# Patient Record
Sex: FEMALE | Race: OTHER | Employment: FULL TIME | URBAN - METROPOLITAN AREA
[De-identification: names, ages, dates, MRNs, and addresses within clinical notes are randomized per-mention and may not be internally consistent; named-entity substitution may affect disease eponyms.]

---
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
## 2020-05-26 ENCOUNTER — OFFICE VISIT (OUTPATIENT)
Dept: FAMILY MEDICINE CLINIC | Age: 20
End: 2020-05-26

## 2020-05-26 DIAGNOSIS — J02.9 SORE THROAT: Primary | ICD-10-CM

## 2020-05-26 DIAGNOSIS — J35.8 TONSILLOLITH: ICD-10-CM

## 2020-05-26 NOTE — PROGRESS NOTES
HISTORY OF PRESENT ILLNESS  Lizy Lowery is a 21 y.o. female. HPI  Patient agrees to telemedicine. Communication done via phone. Patient states she was having a lot of sore throat and couldn't really talk last week because her throat hurt. She denied fever, coughing or bodyaches. Then over the course of the weekend she coughed up what felt like a stone, and her symptoms improved immediately. She is asymptomatic today. And has not had to take any medications. ROS    Physical Exam    ASSESSMENT and PLAN  Diagnoses and all orders for this visit:    1. Sore throat    2. Tonsillolith      It is possible, patient had a tonsillolith,  She is advise to drink plenty of fluids (she does mention she does not drink too much fluids daily). Follow up as needed. COVID-19 hotline contact given to the patient as well if there is any concerns from the patient.   Follow up as needed

## 2021-07-24 ENCOUNTER — HOSPITAL ENCOUNTER (EMERGENCY)
Age: 21
Discharge: HOME | End: 2021-07-25
Attending: EMERGENCY MEDICINE

## 2021-07-24 VITALS
TEMPERATURE: 98.2 F | DIASTOLIC BLOOD PRESSURE: 81 MMHG | RESPIRATION RATE: 16 BRPM | OXYGEN SATURATION: 98 % | HEART RATE: 85 BPM | SYSTOLIC BLOOD PRESSURE: 128 MMHG

## 2021-07-24 DIAGNOSIS — O41.8X10 SUBCHORIONIC HEMORRHAGE OF PLACENTA IN FIRST TRIMESTER, SINGLE OR UNSPECIFIED FETUS: ICD-10-CM

## 2021-07-24 DIAGNOSIS — O46.8X1 SUBCHORIONIC HEMORRHAGE OF PLACENTA IN FIRST TRIMESTER, SINGLE OR UNSPECIFIED FETUS: ICD-10-CM

## 2021-07-24 DIAGNOSIS — M54.50 CHRONIC BILATERAL LOW BACK PAIN WITHOUT SCIATICA: ICD-10-CM

## 2021-07-24 DIAGNOSIS — G89.29 CHRONIC BILATERAL LOW BACK PAIN WITHOUT SCIATICA: ICD-10-CM

## 2021-07-24 DIAGNOSIS — Z3A.10 10 WEEKS GESTATION OF PREGNANCY: ICD-10-CM

## 2021-07-24 DIAGNOSIS — O26.899 PELVIC PAIN DURING PREGNANCY: Primary | ICD-10-CM

## 2021-07-24 DIAGNOSIS — R10.2 PELVIC PAIN DURING PREGNANCY: Primary | ICD-10-CM

## 2021-07-24 DIAGNOSIS — N39.0 URINARY TRACT INFECTION WITHOUT HEMATURIA, SITE UNSPECIFIED: ICD-10-CM

## 2021-07-24 LAB
ALBUMIN SERPL-MCNC: 4 G/DL (ref 3.5–5)
ALBUMIN/GLOB SERPL: 1 {RATIO} (ref 1.1–2.2)
ALP SERPL-CCNC: 68 U/L (ref 45–117)
ALT SERPL-CCNC: 75 U/L (ref 12–78)
ANION GAP SERPL CALC-SCNC: 6 MMOL/L (ref 5–15)
AST SERPL-CCNC: 36 U/L (ref 15–37)
BASOPHILS # BLD: 0.1 K/UL (ref 0–0.1)
BASOPHILS NFR BLD: 0 % (ref 0–1)
BILIRUB SERPL-MCNC: 0.4 MG/DL (ref 0.2–1)
BUN SERPL-MCNC: 7 MG/DL (ref 6–20)
BUN/CREAT SERPL: 12 (ref 12–20)
CALCIUM SERPL-MCNC: 9.3 MG/DL (ref 8.5–10.1)
CHLORIDE SERPL-SCNC: 105 MMOL/L (ref 97–108)
CO2 SERPL-SCNC: 26 MMOL/L (ref 21–32)
CREAT SERPL-MCNC: 0.59 MG/DL (ref 0.55–1.02)
DIFFERENTIAL METHOD BLD: ABNORMAL
EOSINOPHIL # BLD: 0.3 K/UL (ref 0–0.4)
EOSINOPHIL NFR BLD: 3 % (ref 0–7)
ERYTHROCYTE [DISTWIDTH] IN BLOOD BY AUTOMATED COUNT: 11.9 % (ref 11.5–14.5)
GLOBULIN SER CALC-MCNC: 4.2 G/DL (ref 2–4)
GLUCOSE SERPL-MCNC: 115 MG/DL (ref 65–100)
HCG SERPL-ACNC: ABNORMAL MIU/ML (ref 0–6)
HCG UR QL: POSITIVE
HCT VFR BLD AUTO: 37.7 % (ref 35–47)
HGB BLD-MCNC: 12.4 G/DL (ref 11.5–16)
IMM GRANULOCYTES # BLD AUTO: 0.1 K/UL (ref 0–0.04)
IMM GRANULOCYTES NFR BLD AUTO: 1 % (ref 0–0.5)
LYMPHOCYTES # BLD: 3.1 K/UL (ref 0.8–3.5)
LYMPHOCYTES NFR BLD: 28 % (ref 12–49)
MCH RBC QN AUTO: 30.2 PG (ref 26–34)
MCHC RBC AUTO-ENTMCNC: 32.9 G/DL (ref 30–36.5)
MCV RBC AUTO: 92 FL (ref 80–99)
MONOCYTES # BLD: 0.7 K/UL (ref 0–1)
MONOCYTES NFR BLD: 6 % (ref 5–13)
NEUTS SEG # BLD: 7 K/UL (ref 1.8–8)
NEUTS SEG NFR BLD: 62 % (ref 32–75)
NRBC # BLD: 0 K/UL (ref 0–0.01)
NRBC BLD-RTO: 0 PER 100 WBC
PLATELET # BLD AUTO: 336 K/UL (ref 150–400)
PMV BLD AUTO: 10.9 FL (ref 8.9–12.9)
POTASSIUM SERPL-SCNC: 3.5 MMOL/L (ref 3.5–5.1)
PROT SERPL-MCNC: 8.2 G/DL (ref 6.4–8.2)
RBC # BLD AUTO: 4.1 M/UL (ref 3.8–5.2)
SODIUM SERPL-SCNC: 137 MMOL/L (ref 136–145)
UR CULT HOLD, URHOLD: NORMAL
WBC # BLD AUTO: 11.3 K/UL (ref 3.6–11)

## 2021-07-24 PROCEDURE — 87086 URINE CULTURE/COLONY COUNT: CPT

## 2021-07-24 PROCEDURE — 85025 COMPLETE CBC W/AUTO DIFF WBC: CPT

## 2021-07-24 PROCEDURE — 81001 URINALYSIS AUTO W/SCOPE: CPT

## 2021-07-24 PROCEDURE — 36415 COLL VENOUS BLD VENIPUNCTURE: CPT

## 2021-07-24 PROCEDURE — 80053 COMPREHEN METABOLIC PANEL: CPT

## 2021-07-24 PROCEDURE — 99283 EMERGENCY DEPT VISIT LOW MDM: CPT

## 2021-07-24 PROCEDURE — 81025 URINE PREGNANCY TEST: CPT

## 2021-07-24 PROCEDURE — 74011250637 HC RX REV CODE- 250/637: Performed by: NURSE PRACTITIONER

## 2021-07-24 PROCEDURE — 84702 CHORIONIC GONADOTROPIN TEST: CPT

## 2021-07-24 RX ORDER — ACETAMINOPHEN 325 MG/1
650 TABLET ORAL
Status: COMPLETED | OUTPATIENT
Start: 2021-07-24 | End: 2021-07-24

## 2021-07-24 RX ADMIN — ACETAMINOPHEN 650 MG: 325 TABLET ORAL at 22:51

## 2021-07-25 ENCOUNTER — APPOINTMENT (OUTPATIENT)
Dept: ULTRASOUND IMAGING | Age: 21
End: 2021-07-25
Attending: NURSE PRACTITIONER

## 2021-07-25 LAB
APPEARANCE UR: ABNORMAL
BACTERIA URNS QL MICRO: ABNORMAL /HPF
BILIRUB UR QL: NEGATIVE
COLOR UR: ABNORMAL
EPITH CASTS URNS QL MICRO: ABNORMAL /LPF
GLUCOSE UR STRIP.AUTO-MCNC: NEGATIVE MG/DL
HGB UR QL STRIP: NEGATIVE
KETONES UR QL STRIP.AUTO: ABNORMAL MG/DL
LEUKOCYTE ESTERASE UR QL STRIP.AUTO: ABNORMAL
NITRITE UR QL STRIP.AUTO: NEGATIVE
PH UR STRIP: 5 [PH] (ref 5–8)
PROT UR STRIP-MCNC: NEGATIVE MG/DL
RBC #/AREA URNS HPF: ABNORMAL /HPF (ref 0–5)
SP GR UR REFRACTOMETRY: 1.02 (ref 1–1.03)
UROBILINOGEN UR QL STRIP.AUTO: 0.2 EU/DL (ref 0.2–1)
WBC URNS QL MICRO: ABNORMAL /HPF (ref 0–4)

## 2021-07-25 PROCEDURE — 76801 OB US < 14 WKS SINGLE FETUS: CPT

## 2021-07-25 RX ORDER — FOLIC ACID/MULTIVIT,IRON,MINER 0.4MG-18MG
1 TABLET ORAL DAILY
Qty: 30 TABLET | Refills: 0 | Status: SHIPPED | OUTPATIENT
Start: 2021-07-25

## 2021-07-25 RX ORDER — CEPHALEXIN 500 MG/1
500 CAPSULE ORAL 4 TIMES DAILY
Qty: 28 CAPSULE | Refills: 0 | Status: SHIPPED | OUTPATIENT
Start: 2021-07-25 | End: 2021-08-01

## 2021-07-25 NOTE — ED PROVIDER NOTES
HPI   Neeraj Barnes is a  24 y.o. female without any known PMhx who presents ambulatory  to [facility] ED with cc of back pain and pelvic pain. Pt reports lower back pain that is exacerbated w/ movement x3 months. Denies any hx of trauma/ accidents/ falls prior to pain developing. Additionally, reports pelvic pain x1 mos w/o menstrual cycle for 3 months. Took pregnancy test PTA (+). States early AM nausea. Pt denies any vaginal bleeding/ discharge, urinary symptoms, fevers, chills, vomiting, diarrhea. Has not seen an OBGYN. PCP: None    There are no other complaints, changes or physical findings at this time. History reviewed. No pertinent past medical history. History reviewed. No pertinent surgical history. History reviewed. No pertinent family history. Social History     Socioeconomic History    Marital status:      Spouse name: Not on file    Number of children: Not on file    Years of education: Not on file    Highest education level: Not on file   Occupational History    Not on file   Tobacco Use    Smoking status: Never Smoker   Substance and Sexual Activity    Alcohol use: Never    Drug use: Never    Sexual activity: Not on file   Other Topics Concern    Not on file   Social History Narrative    Not on file     Social Determinants of Health     Financial Resource Strain:     Difficulty of Paying Living Expenses:    Food Insecurity:     Worried About Running Out of Food in the Last Year:     [address] in the Last Year:    Transportation Needs:     Lack of Transportation (Medical):      Lack of Transportation (Non-Medical):    Physical Activity:     Days of Exercise per Week:     Minutes of Exercise per Session:    Stress:     Feeling of Stress :    Social Connections:     Frequency of Communication with Friends and Family:     Frequency of Social Gatherings with Friends and Family:     Attends Buddhist Services:     Active Member of Clubs or Organizations:     Attends Club or Organization Meetings:     Marital Status:    Intimate Partner Violence:     Fear of Current or Ex-Partner:     Emotionally Abused:     Physically Abused:     Sexually Abused: ALLERGIES: Patient has no known allergies. Review of Systems   Constitutional: Negative for activity change, appetite change, chills and fever. HENT: Negative for congestion, rhinorrhea, sinus pressure, sneezing and sore throat. Respiratory: Negative for cough and shortness of breath. Cardiovascular: Negative for chest pain. Gastrointestinal: Positive for nausea. Negative for abdominal pain, diarrhea and vomiting. Genitourinary: Positive for menstrual problem and pelvic pain. Negative for dysuria, flank pain, frequency and urgency. Musculoskeletal: Positive for back pain. Negative for arthralgias, myalgias and neck pain. Skin: Negative for color change and rash. Neurological: Negative for dizziness, speech difficulty, weakness, light-headedness, numbness and headaches. Psychiatric/Behavioral: Negative for agitation, behavioral problems and confusion. All other systems reviewed and are negative. Vitals:    07/24/21 2239   BP: 128/81   Pulse: 85   Resp: 16   Temp: 98.2 °F (36.8 °C)   SpO2: 98%            Physical Exam  Vitals and nursing note reviewed. Constitutional:       General: She is not in acute distress. Appearance: She is well-developed. HENT:      Head: Normocephalic and atraumatic. Right Ear: External ear normal.      Left Ear: External ear normal.   Eyes:      Conjunctiva/sclera: Conjunctivae normal.      Pupils: Pupils are equal, round, and reactive to light. Cardiovascular:      Rate and Rhythm: Normal rate and regular rhythm. Heart sounds: Normal heart sounds. Pulmonary:      Effort: Pulmonary effort is normal.      Breath sounds: Normal breath sounds.    Abdominal:      General: Bowel sounds are normal. Palpations: Abdomen is soft. Tenderness: There is no abdominal tenderness. There is no guarding or rebound. Musculoskeletal:         General: Normal range of motion. Cervical back: Normal range of motion and neck supple. Comments: There are no step offs, deformities on palpation of cervical through lumbar spine. There is no para-spinal musculoskeletal TTP or tension noted. Skin:     General: Skin is warm and dry. Neurological:      Mental Status: She is alert and oriented to person, place, and time. Psychiatric:         Behavior: Behavior normal.         Thought Content: Thought content normal.         Judgment: Judgment normal.          MDM  Number of Diagnoses or Management Options  10 weeks gestation of pregnancy  Chronic bilateral low back pain without sciatica  Pelvic pain during pregnancy  Subchorionic hemorrhage of placenta in first trimester, single or unspecified fetus  Urinary tract infection without hematuria, site unspecified  Diagnosis management comments: Ddx: ectopic, threatened AB, miscarriage, pregnancy; lumbar sprain/ MSK spasm/ UTI     Pt w/ c/o back pain likely more MSK in nature- worsens w/ movement; no focal neuro concerns- just localized pain; no point TTP on exam. Told to take Tylenol/ use heat to see if would assist w/ pain management     For pregnancy-IUP 10w w/ subchorionic hemorrhage; correlative BHCG-> given multiple resources for f/u w/ OBGYN including clinics; reviewed worsening/ worrisome s/sx to return to ED     UTI-UCx pending- started on Keflex     Reasons to return to ED discussed; pt stated understanding.         Amount and/or Complexity of Data Reviewed  Clinical lab tests: ordered and reviewed  Tests in the radiology section of CPT®: ordered and reviewed  Review and summarize past medical records: yes           Procedures  LABORATORY TESTS:  Recent Results (from the past 12 hour(s))   CBC WITH AUTOMATED DIFF    Collection Time: 07/24/21 10:45 PM Result Value Ref Range    WBC 11.3 (H) 3.6 - 11.0 K/uL    RBC 4.10 3.80 - 5.20 M/uL    HGB 12.4 11.5 - 16.0 g/dL    HCT 37.7 35.0 - 47.0 %    MCV 92.0 80.0 - 99.0 FL    MCH 30.2 26.0 - 34.0 PG    MCHC 32.9 30.0 - 36.5 g/dL    RDW 11.9 11.5 - 14.5 %    PLATELET 660 877 - 161 K/uL    MPV 10.9 8.9 - 12.9 FL    NRBC 0.0 0  WBC    ABSOLUTE NRBC 0.00 0.00 - 0.01 K/uL    NEUTROPHILS 62 32 - 75 %    LYMPHOCYTES 28 12 - 49 %    MONOCYTES 6 5 - 13 %    EOSINOPHILS 3 0 - 7 %    BASOPHILS 0 0 - 1 %    IMMATURE GRANULOCYTES 1 (H) 0.0 - 0.5 %    ABS. NEUTROPHILS 7.0 1.8 - 8.0 K/UL    ABS. LYMPHOCYTES 3.1 0.8 - 3.5 K/UL    ABS. MONOCYTES 0.7 0.0 - 1.0 K/UL    ABS. EOSINOPHILS 0.3 0.0 - 0.4 K/UL    ABS. BASOPHILS 0.1 0.0 - 0.1 K/UL    ABS. IMM. GRANS. 0.1 (H) 0.00 - 0.04 K/UL    DF AUTOMATED     METABOLIC PANEL, COMPREHENSIVE    Collection Time: 07/24/21 10:45 PM   Result Value Ref Range    Sodium 137 136 - 145 mmol/L    Potassium 3.5 3.5 - 5.1 mmol/L    Chloride 105 97 - 108 mmol/L    CO2 26 21 - 32 mmol/L    Anion gap 6 5 - 15 mmol/L    Glucose 115 (H) 65 - 100 mg/dL    BUN 7 6 - 20 MG/DL    Creatinine 0.59 0.55 - 1.02 MG/DL    BUN/Creatinine ratio 12 12 - 20      GFR est AA >60 >60 ml/min/1.73m2    GFR est non-AA >60 >60 ml/min/1.73m2    Calcium 9.3 8.5 - 10.1 MG/DL    Bilirubin, total 0.4 0.2 - 1.0 MG/DL    ALT (SGPT) 75 12 - 78 U/L    AST (SGOT) 36 15 - 37 U/L    Alk.  phosphatase 68 45 - 117 U/L    Protein, total 8.2 6.4 - 8.2 g/dL    Albumin 4.0 3.5 - 5.0 g/dL    Globulin 4.2 (H) 2.0 - 4.0 g/dL    A-G Ratio 1.0 (L) 1.1 - 2.2     BETA HCG, QT    Collection Time: 07/24/21 10:45 PM   Result Value Ref Range    Beta HCG, QT 90,962 (H) 0 - 6 MIU/ML   URINALYSIS W/MICROSCOPIC    Collection Time: 07/24/21 10:45 PM   Result Value Ref Range    Color YELLOW/STRAW      Appearance CLOUDY (A) CLEAR      Specific gravity 1.023 1.003 - 1.030      pH (UA) 5.0 5.0 - 8.0      Protein Negative NEG mg/dL    Glucose Negative NEG mg/dL Ketone TRACE (A) NEG mg/dL    Bilirubin Negative NEG      Blood Negative NEG      Urobilinogen 0.2 0.2 - 1.0 EU/dL    Nitrites Negative NEG      Leukocyte Esterase MODERATE (A) NEG      WBC 5-10 0 - 4 /hpf    RBC 0-5 0 - 5 /hpf    Epithelial cells FEW FEW /lpf    Bacteria 1+ (A) NEG /hpf   URINE CULTURE HOLD SAMPLE    Collection Time: 07/24/21 10:45 PM    Specimen: Serum; Urine   Result Value Ref Range    Urine culture hold        Urine on hold in Microbiology dept for 2 days. If unpreserved urine is submitted, it cannot be used for addtional testing after 24 hours, recollection will be required. HCG URINE, QL. - POC    Collection Time: 07/24/21 10:50 PM   Result Value Ref Range    Pregnancy test,urine (POC) Positive (A) NEG         IMAGING RESULTS:  US PREG UTS < 14 WKS SNGL   Final Result      Single live 10 week 0 day intrauterine pregnancy. Small subchorionic hemorrhage   Nonvisualization of the ovaries                      MEDICATIONS GIVEN:  Medications   acetaminophen (TYLENOL) tablet 650 mg (650 mg Oral Given 7/24/21 9400)       IMPRESSION:  1. Pelvic pain during pregnancy    2. Chronic bilateral low back pain without sciatica    3. Subchorionic hemorrhage of placenta in first trimester, single or unspecified fetus    4. 10 weeks gestation of pregnancy    5. Urinary tract infection without hematuria, site unspecified        PLAN:  1. Discharge Medication List as of 7/25/2021  1:49 AM      START taking these medications    Details   prenatal vitamin (Prenatal) 28 mg iron- 800 mcg tab tablet Take 1 Tablet by mouth daily. , Print, Disp-30 Tablet, R-0           2.    Follow-up Information     Follow up With Specialties Details Why Contact Info    [address] DEP Emergency Medicine Go to  As needed, If symptoms worsen [address] [facility]  Schedule an appointment as soon as possible for a visit  for OBGYN follow up [address] [place] [address] [address] [facility]  Schedule an appointment as soon as possible for a visit   [address]  [place] [address] [facility],[address]  Schedule an appointment as soon as possible for a visit   [address] [address]  [place] [address] [address] [facility] Department  Schedule an appointment as soon as possible for a visit   [address]  [phone]        3. Return to ED if worse       Discharge Note:    The patient is ready for discharge. The patient's signs, symptoms, diagnosis, and discharge instruction have been discussed and the patient has conveyed their understanding. The patient is to follow up as recommended or return to the ER should their symptoms worsen. Plan has been discussed and the patient is in agreement.     Josselyn Neville, NP

## 2021-07-25 NOTE — ED TRIAGE NOTES
Patient arrives to the ED with c/o back pain, and no menstrual cycle x  3 months, took a home pregnant test which was positive.

## 2021-07-26 LAB
BACTERIA SPEC CULT: NORMAL
CC UR VC: NORMAL
SERVICE CMNT-IMP: NORMAL

## 2021-10-30 ENCOUNTER — HOSPITAL ENCOUNTER (EMERGENCY)
Age: 21
Discharge: STILL ADMITTED OR ADMINISTRATIVE | End: 2021-11-02

## 2021-10-30 ENCOUNTER — APPOINTMENT (OUTPATIENT)
Dept: ULTRASOUND IMAGING | Age: 21
End: 2021-10-30
Attending: ADVANCED PRACTICE MIDWIFE
Payer: MEDICAID

## 2021-10-30 ENCOUNTER — HOSPITAL ENCOUNTER (EMERGENCY)
Age: 21
Discharge: STILL ADMITTED OR ADMINISTRATIVE | End: 2021-10-30
Attending: OBSTETRICS & GYNECOLOGY

## 2021-10-30 ENCOUNTER — HOSPITAL ENCOUNTER (EMERGENCY)
Age: 21
Discharge: HOME | End: 2021-10-30
Admitting: OBSTETRICS & GYNECOLOGY
Payer: MEDICAID

## 2021-10-30 VITALS
SYSTOLIC BLOOD PRESSURE: 104 MMHG | HEART RATE: 81 BPM | TEMPERATURE: 98.3 F | DIASTOLIC BLOOD PRESSURE: 59 MMHG | RESPIRATION RATE: 16 BRPM

## 2021-10-30 VITALS
OXYGEN SATURATION: 99 % | TEMPERATURE: 97.5 F | HEART RATE: 78 BPM | DIASTOLIC BLOOD PRESSURE: 58 MMHG | SYSTOLIC BLOOD PRESSURE: 97 MMHG | RESPIRATION RATE: 16 BRPM

## 2021-10-30 LAB
APPEARANCE UR: CLEAR
BACTERIA URNS QL MICRO: NEGATIVE /HPF
BASOPHILS # BLD: 0 K/UL (ref 0–0.1)
BASOPHILS NFR BLD: 0 % (ref 0–1)
BILIRUB UR QL: NEGATIVE
CLUE CELLS VAG QL WET PREP: NORMAL
COLOR UR: ABNORMAL
DIFFERENTIAL METHOD BLD: ABNORMAL
EOSINOPHIL # BLD: 0.1 K/UL (ref 0–0.4)
EOSINOPHIL NFR BLD: 2 % (ref 0–7)
EPITH CASTS URNS QL MICRO: ABNORMAL /LPF
ERYTHROCYTE [DISTWIDTH] IN BLOOD BY AUTOMATED COUNT: 13 % (ref 11.5–14.5)
GLUCOSE UR STRIP.AUTO-MCNC: NEGATIVE MG/DL
HCT VFR BLD AUTO: 34.1 % (ref 35–47)
HGB BLD-MCNC: 10.8 G/DL (ref 11.5–16)
HGB UR QL STRIP: NEGATIVE
IMM GRANULOCYTES # BLD AUTO: 0 K/UL (ref 0–0.04)
IMM GRANULOCYTES NFR BLD AUTO: 1 % (ref 0–0.5)
KETONES UR QL STRIP.AUTO: NEGATIVE MG/DL
KOH PREP SPEC: NORMAL
LEUKOCYTE ESTERASE UR QL STRIP.AUTO: ABNORMAL
LYMPHOCYTES # BLD: 1.9 K/UL (ref 0.8–3.5)
LYMPHOCYTES NFR BLD: 24 % (ref 12–49)
MCH RBC QN AUTO: 29.4 PG (ref 26–34)
MCHC RBC AUTO-ENTMCNC: 31.7 G/DL (ref 30–36.5)
MCV RBC AUTO: 92.9 FL (ref 80–99)
MONOCYTES # BLD: 0.5 K/UL (ref 0–1)
MONOCYTES NFR BLD: 6 % (ref 5–13)
MUCOUS THREADS URNS QL MICRO: ABNORMAL /LPF
NEUTS SEG # BLD: 5.4 K/UL (ref 1.8–8)
NEUTS SEG NFR BLD: 67 % (ref 32–75)
NITRITE UR QL STRIP.AUTO: NEGATIVE
NRBC # BLD: 0 K/UL (ref 0–0.01)
NRBC BLD-RTO: 0 PER 100 WBC
PH UR STRIP: 7.5 [PH] (ref 5–8)
PLATELET # BLD AUTO: 276 K/UL (ref 150–400)
PMV BLD AUTO: 10.4 FL (ref 8.9–12.9)
PROT UR STRIP-MCNC: NEGATIVE MG/DL
RBC # BLD AUTO: 3.67 M/UL (ref 3.8–5.2)
RBC #/AREA URNS HPF: ABNORMAL /HPF (ref 0–5)
SERVICE CMNT-IMP: NORMAL
SP GR UR REFRACTOMETRY: 1.01 (ref 1–1.03)
T VAGINALIS VAG QL WET PREP: NORMAL
UA: UC IF INDICATED,UAUC: ABNORMAL
UROBILINOGEN UR QL STRIP.AUTO: 0.2 EU/DL (ref 0.2–1)
WBC # BLD AUTO: 8 K/UL (ref 3.6–11)
WBC URNS QL MICRO: ABNORMAL /HPF (ref 0–4)

## 2021-10-30 PROCEDURE — 85025 COMPLETE CBC W/AUTO DIFF WBC: CPT

## 2021-10-30 PROCEDURE — 75810000275 HC EMERGENCY DEPT VISIT NO LEVEL OF CARE

## 2021-10-30 PROCEDURE — 76705 ECHO EXAM OF ABDOMEN: CPT

## 2021-10-30 PROCEDURE — 87210 SMEAR WET MOUNT SALINE/INK: CPT

## 2021-10-30 PROCEDURE — 81001 URINALYSIS AUTO W/SCOPE: CPT

## 2021-10-30 PROCEDURE — 36415 COLL VENOUS BLD VENIPUNCTURE: CPT

## 2021-10-30 PROCEDURE — 99285 EMERGENCY DEPT VISIT HI MDM: CPT

## 2021-10-30 PROCEDURE — 87591 N.GONORRHOEAE DNA AMP PROB: CPT

## 2021-10-30 RX ORDER — METRONIDAZOLE 500 MG/1
500 TABLET ORAL 2 TIMES DAILY WITH MEALS
Qty: 14 TABLET | Refills: 0 | Status: SHIPPED | OUTPATIENT
Start: 2021-10-30 | End: 2021-11-06

## 2021-10-30 NOTE — PROGRESS NOTES
3902 Patient arrived in [facility] with complaints of right side lower abdominal pain and states hasn't felt baby move since last night. No vaginal bleeding or leaking of fluid at this time    0905 Patient now has felt baby kick    0912 VANESSA Alves CNM at bedside and speculum exam done. 1215 VANESSA Benz at bedside and discussing plan of care with patient. Explained to patient to  prescription today to take for Bacterial vaginosis.  Patient understands and will follow up with [facility]     [phone] over discharge instructions with patient and patient verbalized understanding

## 2021-10-30 NOTE — ED PROVIDER NOTES
Interpretor used for patient history taking. Damien Walker is a 23 yo  at 24w1d with an THUY of 21. She presents to the [facility] for right midabdominal pain. Reports it woke her up at about 4 am today and was a 10/10 pain, worsened with moving. It has lessened since that time, is now about a 4/10 on the pain scale. Pt unable to say quality of pain, other than severe and worsened with movement. Pt denies dysuria, vaginal discharge, fever, chills, bodyaches, nausea and vomiting or any other symptoms or concerns. Denies VB and Ctx, endorses good fetal movement. Prenatal care has been received at the [facility]. Pt reports chlamydia early in pregnancy with treatment and repeat test of cure negative. Pt also reports early subchorionic hemorrhages, resolved now. Pt with one prior vaginal delivery without complication. The history is provided by the patient and a significant other. The history is limited by a language barrier. A  was used. Abdominal Pain   This is a new problem. The current episode started 6 to 12 hours ago. The problem occurs constantly. The problem has been gradually improving. Associated with: movement. The pain is located in the RUQ and RLQ. The pain is at a severity of 4/10. The pain is mild. The pain is worsened by activity. The pain is relieved by nothing. Past Medical History:   Diagnosis Date    Chlamydia        Past Surgical History:   Procedure Laterality Date    HX OTHER SURGICAL      tonsillectomy         No family history on file.     Social History     Socioeconomic History    Marital status:      Spouse name: Not on file    Number of children: Not on file    Years of education: Not on file    Highest education level: Not on file   Occupational History    Not on file   Tobacco Use    Smoking status: Never Smoker   Substance and Sexual Activity    Alcohol use: Never    Drug use: Never    Sexual activity: Not on file   Other Topics Concern    Not on file   Social History Narrative    Not on file     Social Determinants of Health     Financial Resource Strain:     Difficulty of Paying Living Expenses:    Food Insecurity:     Worried About Running Out of Food in the Last Year:     [address] in the Last Year:    Transportation Needs:     Lack of Transportation (Medical):  Lack of Transportation (Non-Medical):    Physical Activity:     Days of Exercise per Week:     Minutes of Exercise per Session:    Stress:     Feeling of Stress :    Social Connections:     Frequency of Communication with Friends and Family:     Frequency of Social Gatherings with Friends and Family:     Attends Scientologist Services:     Active Member of Clubs or Organizations:     Attends Club or Organization Meetings:     Marital Status:    Intimate Partner Violence:     Fear of Current or Ex-Partner:     Emotionally Abused:     Physically Abused:     Sexually Abused: ALLERGIES: Patient has no known allergies. Review of Systems   Constitutional: Negative. HENT: Negative. Eyes: Negative. Respiratory: Negative. Cardiovascular: Negative. Gastrointestinal: Positive for abdominal pain. Endocrine: Negative. Genitourinary: Negative. Musculoskeletal: Negative. Skin: Negative. Allergic/Immunologic: Negative. Neurological: Negative. Hematological: Negative. Psychiatric/Behavioral: Negative. Vitals:    10/30/21 0925   BP: (!) 104/59   Pulse: 81            Physical Exam  Vitals and nursing note reviewed. Exam conducted with a chaperone present. Constitutional:       Appearance: She is well-developed and normal weight. HENT:      Head: Normocephalic and atraumatic. Mouth/Throat:      Mouth: Mucous membranes are moist.      Pharynx: Oropharynx is clear. Eyes:      Extraocular Movements: Extraocular movements intact.    Cardiovascular:      Rate and Rhythm: Normal rate and regular rhythm. Heart sounds: Normal heart sounds. Pulmonary:      Effort: Pulmonary effort is normal.      Breath sounds: Normal breath sounds. Abdominal:      General: Bowel sounds are normal. There is no distension. Palpations: Abdomen is soft. There is no mass. Tenderness: There is abdominal tenderness in the right upper quadrant. There is no guarding or rebound. Negative signs include Rovsing's sign. Hernia: No hernia is present. Comments: Gravid, soft to palpation   Genitourinary:     Vagina: Vaginal discharge present. Cervix: Normal.      Uterus: Enlarged. Comments: Sterile speculum exam: cervix visually closed    Frothy white vaginal discharge noted  Skin:     General: Skin is warm and dry. Capillary Refill: Capillary refill takes less than 2 seconds. Neurological:      Mental Status: She is alert and oriented to person, place, and time.    Psychiatric:         Mood and Affect: Mood normal.         Behavior: Behavior normal.      NST: Monitored for 40 minutes, reactive, cat 1, baseline 145, positive accels, no decels, moderate variability, no ctx, uterus soft    Patient Vitals for the past 4 hrs:   Temp Pulse Resp BP   10/30/21 0925 98.3 °F (36.8 °C) 81 16 (!) 104/59         MDM  Number of Diagnoses or Management Options     Amount and/or Complexity of Data Reviewed  Clinical lab tests: ordered and reviewed (Wet prep/koh, ua with reflex culture, cbc, gc/ct    abd ultrasound)  Decide to obtain previous medical records or to obtain history from someone other than the patient: yes  Review and summarize past medical records: yes  Independent visualization of images, tracings, or specimens: yes    Risk of Complications, Morbidity, and/or Mortality  Presenting problems: moderate  Diagnostic procedures: high  Management options: moderate    Critical Care  Total time providing critical care: > 105 minutes    Patient Progress  Patient progress: stable    ED Course as of Oct 30 1234   Sat Oct 30, 2021   1228 Pt was admitted to [facility]  NST  Sterile speculum exam: wet/prep/KOH, GC/CT  Urinalysis with reflex culture  Abd ultrasound to rule out appendicitis      [LA]   1229 KAREN, OTHER SOURCES:    Special Requests: NO SPECIAL REQUESTS   KOH NO YEAST SEEN [LA]   1229 CHLAMYDIA / GC-AMPLIFIED [LA]   1229 WET PREP:    Clue cells CLUE CELLS PRESENT   Wet prep NO TRICHOMONAS SEEN [LA]   1229 URINALYSIS W/ REFLEX CULTURE(!):    Color YELLOW/STRAW   Appearance CLEAR   Specific gravity 1.009   pH (UA) 7.5   Protein Negative   Glucose Negative   Ketone Negative   Bilirubin Negative   Blood Negative   Urobilinogen 0.2   Nitrites Negative   Leukocyte Esterase SMALL(!)   WBC 0-4   RBC 0-5   Epithelial cells MANY(!)   Bacteria Negative   UA:UC IF INDICATED CULTURE NOT INDICATED BY UA RESULT   Mucus TRACE(!) [LA]   1229 CBC WITH AUTOMATED DIFF(!):    WBC 8.0   RBC 3.67(!)   HGB 10.8(!)   HCT 34.1(!)   MCV 92.9   MCH 29.4   MCHC 31.7   RDW 13.0   PLATELET 611   MPV 63.5   NRBC 0.0   ABSOLUTE NRBC 0.00   NEUTROPHILS 67   LYMPHOCYTES 24   MONOCYTES 6   EOSINOPHILS 2   BASOPHILS 0   IMMATURE GRANULOCYTES 1(!)   ABS. NEUTROPHILS 5.4   ABS. LYMPHOCYTES 1.9   ABS. MONOCYTES 0.5   ABS. EOSINOPHILS 0.1   ABS. BASOPHILS 0.0   ABS. IMM. GRANS. 0.0   DF AUTOMATED [LA]   7410 Pt reports pain is resolved. Reviewed ultrasound with pt. Discussed no evidence of appendicitis. Discussed diagnosis of BV, antibiotic for treatment. Will send to pharmacy. Will call if GC/CT not negative when resulted. Discussed in detail round ligament pain and relief measures. D/C home, keep next routine OB appt at [address] [place] [address], take records from today to update them on what happened at this visit.    Return PRN   US ABD LTD [LA]      ED Course User Index  [LA] Erika Molina CNM

## 2021-10-30 NOTE — DISCHARGE INSTRUCTIONS
Patient Education   Patient Education        Vaginosis bacteriana: Instrucciones de cuidado  Bacterial Vaginosis: Care Instructions  Instrucciones de cuidado    La vaginosis bacteriana es un tipo de infección vaginal. Es causada por el crecimiento excesivo de ciertas bacterias que se encuentran normalmente en la vagina. Entre los síntomas se incluyen comezón, hinchazón, dolor al orinar o tener relaciones sexuales, y flujo grisáceo o amarillento con olor a pescado. No se considera benny infección que se transmita por contacto sexual.  Aunque los síntomas pueden ser molestos e incómodos, la vaginosis bacteriana no suele causar otros problemas de [place]. Sin embargo, puede causar complicaciones si la tiene mientras está Tod de [place]. Si bill la infección podría desaparecer por sí jed, la mayoría de los médicos utilizan antibióticos para ramos tratamiento. Es posible que le hayan recetado pastillas o cremas vaginales. Con tratamiento, la vaginosis bacteriana suele desaparecer al cabo de 5 a 7 días. La atención de seguimiento es benny parte clave de ramos tratamiento y seguridad. Asegúrese de hacer y acudir a todas las citas, y llame a ramos médico si está teniendo problemas. También es benny buena idea saber los resultados de zach exámenes y mantener benny lista de los medicamentos que po. ¿Cómo puede cuidarse en el hogar? · [address] No deje de tomarlos por el hecho de sentirse mejor. Debe jacob todos los antibióticos hasta terminarlos. · Si está tomando metronidazol (Flagyl), no coma ni estrella nada que contenga alcohol. · Siga utilizando los medicamentos aunque comience el período menstrual. Utilice toallas sanitarias en lugar de tampones celine el tiempo que utilice la crema vaginal o supositorios. Los tampones pueden absorber el medicamento. · Use ropa holgada de algodón. No utilice nylon ni otros materiales que conserven el calor corporal y la humedad cerca de la piel. · No se rasque. Alivie la comezón con benny compresa fría o un baño frío. · No se lave la cheryl vaginal más de benny vez al día. Use solo agua corriente o un Malachi Genre y sin perfume. No use lavados vaginales. ¿Cuándo debe pedir ayuda? Preste especial atención a los cambios en ramos liborio y asegúrese de comunicarse con ramos médico si:    · Tiene sangrado vaginal inesperado.     · Tiene fiebre.     · Tiene mayor dolor o dolor nuevo en la vagina o la pelvis.     · No mejora después de 1 semana.     · Charlene síntomas regresan después de que termina charlene medicamentos. ¿Dónde puede encontrar más información en inglés? Vaya a http://[email or web address]/  Claudette [id number] en la búsqueda para aprender más acerca de \"Vaginosis bacteriana: Instrucciones de cuidado. \"  Revisado: 11 febrero, 2021               Versión del contenido: 13.0  © 1308-9978 Healthwise, Incorporated. Las instrucciones de cuidado fueron adaptadas bajo licencia por [employer] (which disclaims liability or warranty for this information). Si usted tiene [place] afección médica o sobre estas instrucciones, siempre pregunte a ramos profesional de liborio. [employer], [employer] ([employer]) niega toda garantía o responsabilidad por ramos uso de esta información. Semanas 22 a 26 de ramos embarazo: Instrucciones de cuidado  Weeks 22 to 26 of Your Pregnancy: Care Instructions  Instrucciones de cuidado    Al comenzar el 7.º mes de ramos embarazo en la semana 26, los pulmones de ramos bebé se están volviendo más mahad y se están preparando para respirar. Podría notar que ramos bebé responde al eliecer de ramos voz o la de ramos adam. También podría notar que ramos bebé se voltea y [place] menos de posición, y se retuerce o sacude más. Por lo general, las sacudidas indican que ramos bebé tiene hipo. Tener hipo es totalmente normal y dura poco tiempo. Pedrito vez sea buena idea pensar en asistir a benny clase de preparación para el parto.  Leta es también un buen momento para comenzar a pensar si desea que celine el parto le administren un analgésico (medicamento para el dolor). A la mayoría de las mujeres embarazadas les hacen pruebas para la diabetes gestacional entre las semanas 24 y 29. La diabetes gestacional ocurre cuando el nivel de azúcar en la chelsy es muy alto celine el [facility]. La prueba es importante, porque usted puede tener diabetes gestacional y no saberlo. Nandini esta enfermedad puede causarle problemas a ramos bebé. La atención de seguimiento es benny parte clave de ramos tratamiento y seguridad. Asegúrese de hacer y acudir a todas las citas, y llame a ramos médico si está teniendo problemas. También es benny buena idea saber los resultados de zach exámenes y mantener benny lista de los medicamentos que po. ¿Cómo puede cuidarse en el hogar? Alivie las molestias de las patadas de ramos bebé  · Cambie de posición. A veces, bailey cambio hace que ramos bebé también cambie de posición. · Respire hondo al mismo tiempo que levanta los brazos sobre ramos [place]. Después exhale a medida que baja los brazos. Raj los ejercicios de Kegel para evitar pérdidas de [place]  · [employer]ch ejercicios de Kegel estando thompson o sentada. ? Apriete los mismos músculos que usaría para detener el chorro de [place]. El abdomen y los muslos no deben moverse. ? Manténgalos apretados celine 3 segundos y, luego, relájelos otros 3 segundos. ? Empiece con 3 segundos. Destin Britt, añada 1 julita cada semana hasta que sea capaz de apretar celine 10 segundos. ? Repita el ejercicio entre 10 y 13 veces [address] Bolanos[address]ft[address] Raj tito o más sesiones cada día. Alivie o reduzca la hinchazón de los pies, los tobillos, las luis antonio y los dedos  · Si tiene los dedos hinchados, quítese los [place]. · No coma alimentos con mucha sal, delbert meghana fritas. · Coloque zach pies sobre un banco o un sofá todo el tiempo que le sea posible. Duerma con almohadas debajo de los pies. · No se quede de pie celine mucho tiempo ni use calzado ajustado.   · Use medias elásticas de soporte. ¿Dónde puede encontrar más información en inglés? Vaya a http://www.gray.com/  Claudette [id number] en la búsqueda para aprender más acerca de \"Semanas 22 a 26 de ramos embarazo: Instrucciones de cuidado. \"  Revisado: 16 junio, 2021               Versión del contenido: 13.0  © 9069-8242 Healthwise, [employer]. Las instrucciones de cuidado fueron adaptadas bajo licencia por [facility] (which disclaims liability or warranty for this information). Si usted tiene [place] afección médica o sobre estas instrucciones, siempre pregunte a ramos profesional de liborio. [email or web address], [employer] niega toda garantía o responsabilidad por ramos uso de esta información.

## 2021-10-30 NOTE — ED TRIAGE NOTES
Patient arrives ambulatory to the ED with chief complaint of R sided abdominal pain onset this morning. Patient is 5 months pregnant. Denies vaginal bleeding. Patient is Tajik speaking. Tiny  used for triage.

## 2021-11-02 LAB
C TRACH RRNA SPEC QL NAA+PROBE: NEGATIVE
N GONORRHOEA RRNA SPEC QL NAA+PROBE: NEGATIVE
SPECIMEN SOURCE: NORMAL

## 2023-11-07 ENCOUNTER — HOSPITAL ENCOUNTER (EMERGENCY)
Facility: HOSPITAL | Age: 23
Discharge: HOME | End: 2023-11-07
Attending: STUDENT IN AN ORGANIZED HEALTH CARE EDUCATION/TRAINING PROGRAM

## 2023-11-07 VITALS
RESPIRATION RATE: 16 BRPM | OXYGEN SATURATION: 98 % | DIASTOLIC BLOOD PRESSURE: 66 MMHG | HEIGHT: 64 IN | WEIGHT: 130 LBS | HEART RATE: 75 BPM | SYSTOLIC BLOOD PRESSURE: 109 MMHG | BODY MASS INDEX: 22.2 KG/M2 | TEMPERATURE: 98.4 F

## 2023-11-07 DIAGNOSIS — S46.811A STRAIN OF RIGHT TRAPEZIUS MUSCLE, INITIAL ENCOUNTER: Primary | ICD-10-CM

## 2023-11-07 DIAGNOSIS — M62.838 SPASM OF MUSCLE: ICD-10-CM

## 2023-11-07 LAB
EKG ATRIAL RATE: 81 BPM
EKG DIAGNOSIS: NORMAL
EKG P AXIS: 61 DEGREES
EKG P-R INTERVAL: 154 MS
EKG Q-T INTERVAL: 394 MS
EKG QRS DURATION: 92 MS
EKG QTC CALCULATION (BAZETT): 457 MS
EKG R AXIS: 68 DEGREES
EKG T AXIS: 39 DEGREES
EKG VENTRICULAR RATE: 81 BPM

## 2023-11-07 PROCEDURE — 93010 ELECTROCARDIOGRAM REPORT: CPT | Performed by: INTERNAL MEDICINE

## 2023-11-07 PROCEDURE — 99283 EMERGENCY DEPT VISIT LOW MDM: CPT

## 2023-11-07 PROCEDURE — 93005 ELECTROCARDIOGRAM TRACING: CPT | Performed by: STUDENT IN AN ORGANIZED HEALTH CARE EDUCATION/TRAINING PROGRAM

## 2023-11-07 RX ORDER — CYCLOBENZAPRINE HCL 10 MG
10 TABLET ORAL 3 TIMES DAILY PRN
Qty: 30 TABLET | Refills: 0 | Status: SHIPPED | OUTPATIENT
Start: 2023-11-07 | End: 2023-11-17

## 2023-11-07 RX ORDER — NAPROXEN 500 MG/1
500 TABLET ORAL 2 TIMES DAILY WITH MEALS
Qty: 20 TABLET | Refills: 0 | Status: SHIPPED | OUTPATIENT
Start: 2023-11-07 | End: 2023-11-17

## 2023-11-07 RX ORDER — ACETAMINOPHEN 500 MG
1000 TABLET ORAL EVERY 6 HOURS PRN
Qty: 80 TABLET | Refills: 0 | Status: SHIPPED | OUTPATIENT
Start: 2023-11-07 | End: 2023-11-17

## 2023-11-07 RX ORDER — LIDOCAINE 4 G/G
1 PATCH TOPICAL DAILY
Qty: 10 EACH | Refills: 0 | Status: SHIPPED | OUTPATIENT
Start: 2023-11-07 | End: 2023-11-17

## 2023-11-07 ASSESSMENT — PAIN - FUNCTIONAL ASSESSMENT: PAIN_FUNCTIONAL_ASSESSMENT: 0-10

## 2023-11-07 ASSESSMENT — PAIN DESCRIPTION - ORIENTATION: ORIENTATION: RIGHT

## 2023-11-07 ASSESSMENT — PAIN DESCRIPTION - LOCATION: LOCATION: SHOULDER

## 2023-11-07 NOTE — ED TRIAGE NOTES
Pt. Presents with (R) shoulder pain and (R) upper extremity weakness since 3am this morning. Pt. Denies injury, has brisk capillary refill and no obvious injuries or deformities. Pt. Denies CP and SOB, EKG was performed on arrival due to location of right shoulder pain.  VSS

## 2023-11-07 NOTE — DISCHARGE INSTRUCTIONS
You presented to the ED with right upper back pain and a pinpoint area consistent with muscle spasm/muscle strain. This is most likely secondary to repetitive motions at work. Please review spasm exercises and upper back exercises. Work note provided. Take prescribed medications for symptomatic management. Follow-up with PCP.

## 2024-04-18 ENCOUNTER — HOSPITAL ENCOUNTER (OUTPATIENT)
Facility: HOSPITAL | Age: 24
Setting detail: SPECIMEN
Discharge: HOME | End: 2024-04-21

## 2024-04-18 PROCEDURE — 80053 COMPREHEN METABOLIC PANEL: CPT

## 2024-04-18 PROCEDURE — 80061 LIPID PANEL: CPT

## 2024-04-18 PROCEDURE — 36415 COLL VENOUS BLD VENIPUNCTURE: CPT

## 2024-04-18 PROCEDURE — 83036 HEMOGLOBIN GLYCOSYLATED A1C: CPT

## 2024-04-18 PROCEDURE — 84443 ASSAY THYROID STIM HORMONE: CPT

## 2024-04-19 LAB
ALBUMIN SERPL-MCNC: 4.4 G/DL (ref 3.5–5)
ALBUMIN/GLOB SERPL: 1.1 (ref 1.1–2.2)
ALP SERPL-CCNC: 54 U/L (ref 45–117)
ALT SERPL-CCNC: 80 U/L (ref 12–78)
ANION GAP SERPL CALC-SCNC: 7 MMOL/L (ref 5–15)
AST SERPL-CCNC: 38 U/L (ref 15–37)
BILIRUB SERPL-MCNC: 0.5 MG/DL (ref 0.2–1)
BUN SERPL-MCNC: 11 MG/DL (ref 6–20)
BUN/CREAT SERPL: 14 (ref 12–20)
CALCIUM SERPL-MCNC: 9.7 MG/DL (ref 8.5–10.1)
CHLORIDE SERPL-SCNC: 107 MMOL/L (ref 97–108)
CHOLEST SERPL-MCNC: 196 MG/DL
CO2 SERPL-SCNC: 25 MMOL/L (ref 21–32)
CREAT SERPL-MCNC: 0.81 MG/DL (ref 0.55–1.02)
EST. AVERAGE GLUCOSE BLD GHB EST-MCNC: 103 MG/DL
GLOBULIN SER CALC-MCNC: 4 G/DL (ref 2–4)
GLUCOSE SERPL-MCNC: 98 MG/DL (ref 65–100)
HBA1C MFR BLD: 5.2 % (ref 4–5.6)
HDLC SERPL-MCNC: 43 MG/DL
HDLC SERPL: 4.6 (ref 0–5)
LDLC SERPL CALC-MCNC: 127 MG/DL (ref 0–100)
POTASSIUM SERPL-SCNC: 3.4 MMOL/L (ref 3.5–5.1)
PROT SERPL-MCNC: 8.4 G/DL (ref 6.4–8.2)
SODIUM SERPL-SCNC: 139 MMOL/L (ref 136–145)
TRIGL SERPL-MCNC: 130 MG/DL
TSH SERPL DL<=0.05 MIU/L-ACNC: 1.1 UIU/ML (ref 0.36–3.74)
VLDLC SERPL CALC-MCNC: 26 MG/DL

## 2024-12-13 ENCOUNTER — HOSPITAL ENCOUNTER (OUTPATIENT)
Facility: HOSPITAL | Age: 24
Setting detail: SPECIMEN
Discharge: HOME | End: 2024-12-16

## 2024-12-13 PROCEDURE — 88142 CYTOPATH C/V THIN LAYER: CPT
